# Patient Record
Sex: FEMALE | Race: WHITE | Employment: UNEMPLOYED | URBAN - METROPOLITAN AREA
[De-identification: names, ages, dates, MRNs, and addresses within clinical notes are randomized per-mention and may not be internally consistent; named-entity substitution may affect disease eponyms.]

---
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
## 2025-06-26 ENCOUNTER — OFFICE VISIT (OUTPATIENT)
Dept: URGENT CARE | Facility: URGENT CARE | Age: 20
End: 2025-06-26
Payer: COMMERCIAL

## 2025-06-26 VITALS
WEIGHT: 168 LBS | HEIGHT: 63 IN | HEART RATE: 61 BPM | TEMPERATURE: 98.7 F | SYSTOLIC BLOOD PRESSURE: 130 MMHG | DIASTOLIC BLOOD PRESSURE: 83 MMHG | BODY MASS INDEX: 29.77 KG/M2 | RESPIRATION RATE: 20 BRPM | OXYGEN SATURATION: 99 %

## 2025-06-26 DIAGNOSIS — R19.7 DIARRHEA, UNSPECIFIED TYPE: Primary | ICD-10-CM

## 2025-06-26 DIAGNOSIS — Z78.9 H/O FOREIGN TRAVEL: ICD-10-CM

## 2025-06-26 NOTE — PROGRESS NOTES
Chief Complaint   Patient presents with    Urgent Care     Diarrhea, nausea x 5 days, body sweats. Pt has abdominal pain before having bowel movements. Pt visited [place] and came back on Friday.        Carmen was seen today for urgent care.    Diagnoses and all orders for this visit:    Diarrhea, unspecified type  -     Enteric Bacteria and Virus Panel by [facility] Stool; Future    H/O foreign travel        (A) Viral Gastroenteritis    (P) I have recommended small amounts clear fluids frequently, Pedialyte, dilute gatorade, soups, water, and advance diet as tolerated. Return office visit if symptoms persist or worsen; I have alerted the patient to call if high fever, dehydration, marked weakness, fainting, increased abdominal pain, blood in stool or vomit.    (S) Carmen Hammond is a 20 year old female with complaint of gastrointestinal symptoms of cramping and diarrhea for 5 days. No blood in stool.  ' She has a history of travel to [place] came back 6 days back and the symptoms started the day after she came to [place].  She has been noticing some cramping but denies any fever or chills.    (O) Physical exam reveals the patient appears well. Hydration status: well hydrated. Abdomen: abdomen is soft without significant tenderness, masses, organomegaly or guarding.    Omaira Li MD

## 2025-06-28 ENCOUNTER — RESULTS FOLLOW-UP (OUTPATIENT)
Dept: URGENT CARE | Facility: URGENT CARE | Age: 20
End: 2025-06-28

## 2025-06-28 DIAGNOSIS — A07.4 INFECTION OF INTESTINE DUE TO CYCLOSPORA CAYETANENSIS: Primary | ICD-10-CM

## 2025-06-28 RX ORDER — SULFAMETHOXAZOLE AND TRIMETHOPRIM 800; 160 MG/1; MG/1
1 TABLET ORAL 2 TIMES DAILY
Qty: 14 TABLET | Refills: 0 | Status: SHIPPED | OUTPATIENT
Start: 2025-06-28 | End: 2025-07-05